# Patient Record
Sex: MALE | Race: WHITE | URBAN - METROPOLITAN AREA
[De-identification: names, ages, dates, MRNs, and addresses within clinical notes are randomized per-mention and may not be internally consistent; named-entity substitution may affect disease eponyms.]

---

## 2017-06-22 ENCOUNTER — HOSPITAL ENCOUNTER (EMERGENCY)
Facility: CLINIC | Age: 32
Discharge: HOME | End: 2017-06-22
Attending: EMERGENCY MEDICINE | Admitting: EMERGENCY MEDICINE
Payer: COMMERCIAL

## 2017-06-22 VITALS
RESPIRATION RATE: 22 BRPM | SYSTOLIC BLOOD PRESSURE: 144 MMHG | DIASTOLIC BLOOD PRESSURE: 93 MMHG | TEMPERATURE: 97.9 F | OXYGEN SATURATION: 95 %

## 2017-06-22 DIAGNOSIS — S05.02XA CORNEAL ABRASION, LEFT, INITIAL ENCOUNTER: ICD-10-CM

## 2017-06-22 DIAGNOSIS — L03.213 PRESEPTAL CELLULITIS OF LEFT EYE: ICD-10-CM

## 2017-06-22 PROCEDURE — 99283 EMERGENCY DEPT VISIT LOW MDM: CPT

## 2017-06-22 PROCEDURE — 25000132 ZZH RX MED GY IP 250 OP 250 PS 637: Performed by: EMERGENCY MEDICINE

## 2017-06-22 PROCEDURE — 25000125 ZZHC RX 250: Performed by: EMERGENCY MEDICINE

## 2017-06-22 RX ORDER — CEPHALEXIN 500 MG/1
500 CAPSULE ORAL ONCE
Status: COMPLETED | OUTPATIENT
Start: 2017-06-22 | End: 2017-06-22

## 2017-06-22 RX ORDER — CEPHALEXIN 500 MG/1
500 CAPSULE ORAL 4 TIMES DAILY
Qty: 28 CAPSULE | Refills: 0 | Status: SHIPPED | OUTPATIENT
Start: 2017-06-22 | End: 2017-06-29

## 2017-06-22 RX ORDER — PROPARACAINE HYDROCHLORIDE 5 MG/ML
2 SOLUTION/ DROPS OPHTHALMIC ONCE
Status: COMPLETED | OUTPATIENT
Start: 2017-06-22 | End: 2017-06-22

## 2017-06-22 RX ORDER — SULFAMETHOXAZOLE/TRIMETHOPRIM 800-160 MG
1 TABLET ORAL ONCE
Status: COMPLETED | OUTPATIENT
Start: 2017-06-22 | End: 2017-06-22

## 2017-06-22 RX ORDER — OFLOXACIN 3 MG/ML
2 SOLUTION/ DROPS OPHTHALMIC ONCE
Status: COMPLETED | OUTPATIENT
Start: 2017-06-22 | End: 2017-06-22

## 2017-06-22 RX ORDER — SULFAMETHOXAZOLE/TRIMETHOPRIM 800-160 MG
1 TABLET ORAL 2 TIMES DAILY
Qty: 14 TABLET | Refills: 0 | Status: SHIPPED | OUTPATIENT
Start: 2017-06-22 | End: 2017-06-29

## 2017-06-22 RX ADMIN — PROPARACAINE HYDROCHLORIDE 2 DROP: 5 SOLUTION/ DROPS OPHTHALMIC at 02:25

## 2017-06-22 RX ADMIN — OFLOXACIN 2 DROP: 3 SOLUTION/ DROPS OPHTHALMIC at 02:36

## 2017-06-22 RX ADMIN — FLUORESCEIN SODIUM 1 STRIP: 1 STRIP OPHTHALMIC at 02:26

## 2017-06-22 RX ADMIN — SULFAMETHOXAZOLE AND TRIMETHOPRIM 1 TABLET: 800; 160 TABLET ORAL at 02:27

## 2017-06-22 RX ADMIN — CEPHALEXIN 500 MG: 500 CAPSULE ORAL at 02:27

## 2017-06-22 ASSESSMENT — VISUAL ACUITY
OD: 20/80
OS: 20/100
OD: 20/80
OS: 20/80

## 2017-06-22 ASSESSMENT — ENCOUNTER SYMPTOMS
EYE PAIN: 1
EYE REDNESS: 1
PHOTOPHOBIA: 1

## 2017-06-22 NOTE — ED AVS SNAPSHOT
Emergency Department    [address] [place] [address]    Phone:  [phone]    Fax:  [phone]                                       Trev Marinelli   MRN: [id number]    Department:   Emergency Department   Date of Visit:  6/22/2017           After Visit Summary Signature Page     I have received my discharge instructions, and my questions have been answered. I have discussed any challenges I see with this plan with the nurse or doctor.    ..........................................................................................................................................  Patient/Patient Representative Signature      ..........................................................................................................................................  Patient Representative Print Name and Relationship to Patient    ..................................................               ................................................  Date                                            Time    ..........................................................................................................................................  Reviewed by Signature/Title    ...................................................              ..............................................  Date                                                            Time

## 2017-06-22 NOTE — ED PROVIDER NOTES
History     Chief Complaint:  Eye problem     HPI   Trev Marinelli is a 31 year old male who presents to the emergency department today for evaluation of a left eye problem. The patient reports experiencing worsening left eye redness, pain, and photophobia over last week. He states trouble keeping his left eye open over the last week 2/2 pain with light. Additionally, he states any changing or brightened light blinds him with pain for a few minutes and then passes. He originally attributed his problem to his contact lenses, however he did not wear them today and his pain continued to worsen. Due to worsening eye pain he presents to the emergency department today for further evaluation. On examination in a dimly lit room he states his eye is doing fine, and has no problem keeping it open. He denies any pain with moving his eye or any recent eye trauma. He does note that his L eyelids seemed slightly swollen earlier today. The patient has no other concerns at this time.     Allergies:  Amoxicillin      Medications:    The patient is currently on no regular medications.    Past Medical History:    Asthma  Substance abuse     Past Surgical History:    ENT surgery     Family History:    No family history on file.    Social History:  The patient was accompanied to the ED by a friend.  Smoking Status: Current every day smoker (1/2 pack)  Smokeless Tobacco: Never used   Alcohol Use: No (quit 11/2016)  Marital Status:        Review of Systems   Eyes: Positive for photophobia, pain, redness and visual disturbance.   All other systems reviewed and are negative.      Physical Exam   First Vitals: BP (!) 176/113  Temp 97.9  F (36.6  C) (Oral)  Resp 22  SpO2 95%       Physical Exam   General/Appearance: appears stated age, well-groomed, appears mildly comfortable, holding eyes closed in light  Eyes: EOMI, no pain with ranging eyes, PERRL, L injected sclera, no conjunctival hemorrhage, L upper and lower lids slightly  edematous, no FB on eyelid eversion, no hyphema, no hypopyon, fluorescein with punctate abnormal uptake approx inferior aspect of L, no Tejas sign  Visual Acuity - R 20/40, L 20/40 (both uncorrected  ENT: MMM  Neck: supple, nl ROM, no stiffness  Cardiovascular: RRR, nl S1S2, no m/r/g, 2+ pulses in all 4 extremities, cap refill <2sec  Respiratory: CTAB, good air movement throughout, no wheezes/rhonchi/rales, no increased WOB, no retractions  Skin: warm and well-perfused, no rash, no edema, no ecchymosis, nl turgor  Neuro: GCS 15, alert and oriented, no gross focal neuro deficits    Emergency Department Course     Interventions:  0225 Alcaine 0.5% opthalmic solution 2 drops to left eye   0226 Fluorescein opthalmic strip given to left eye   0227 Bactrim DS/Septra -160mg tablet PO  0227 Keflex 500mg PO      Emergency Department Course:  Nursing notes and vitals reviewed.  I performed an exam of the patient as documented above.   0202 Patient rechecked and updated.  I discussed the treatment plan with the patient. They expressed understanding of this plan and consented to discharge. They will be discharged home with instructions for care and follow up. In addition, the patient will return to the emergency department if their symptoms persist, worsen, if new symptoms arise or if there is any concern.  All questions were answered.      Impression & Plan      Medical Decision Making:  Trev Marinelli is a 31 year old male who presents with increasing left eye irritation and photophobia over the past week. He does wear contact lenses. There is a small palpate area of increased fluorescein uptake approximately 7 o'clock on his left iris that I suspect is the primary cause. I think this is likely continued to be irritated over the week as he has been wearing his contact lenses. I am not able to appreciate any foreign body nor visible hypopyon or hyphema. His sclera is injected but otherwise there is no keratitis or other  uptake. His left eyelids questionably are slightly edematous. He does state that they were visibly swollen before arrival approximately one to two hours ago. I'd rather be safe than sorry and therefore I am starting Keflex and Bactrim to treat for possible pre septile cellulitis. He has no pain with occular movement, visual changes. His visual acuity here is normal. I doubt orbital cellulitis, acute glaucoma, other intraocular pathologies. I have asked him to follow up with the ophthalmologist tomorrow if symptoms are not improving.     Diagnosis:    ICD-10-CM    1. Corneal abrasion, left, initial encounter S05.02XA    2. Preseptal cellulitis of left eye L03.213      Disposition:   Discharged to home with the below prescription     Discharge Medications:  New Prescriptions    CEPHALEXIN (KEFLEX) 500 MG CAPSULE    Take 1 capsule (500 mg) by mouth 4 times daily for 7 days    SULFAMETHOXAZOLE-TRIMETHOPRIM (BACTRIM DS) 800-160 MG PER TABLET    Take 1 tablet by mouth 2 times daily for 7 days       Scribe Disclosure:  I, Jeevan Holden, am serving as a scribe at 1:49 AM on 6/22/2017 to document services personally performed by Nickie Myers MD, based on my observations and the provider's statements to me.      Jeevan Holden  6/22/2017    EMERGENCY DEPARTMENT       Nickie Myers MD  06/22/17 [id number]

## 2017-06-22 NOTE — ED AVS SNAPSHOT
Emergency Department    [address] [facility] [address]    Phone:  [phone]    Fax:  [phone]                                       Trev Marinelli   MRN: [id number]    Department:   Emergency Department   Date of Visit:  6/22/2017           Patient Information     Date Of Birth          1985        Your diagnoses for this visit were:     Corneal abrasion, left, initial encounter     Preseptal cellulitis of left eye        You were seen by Nickie Myers MD.      Follow-up Information     Follow up with Hunter Arteaga MD. Schedule an appointment as soon as possible for a visit in 1 day.    Specialty:  Ophthalmology    Contact information:    [facility]  [address] [id number] [phone]          Follow up with  Emergency Department.    Specialty:  EMERGENCY MEDICINE    Why:  As needed, If symptoms worsen    Contact information:    [address] [facility] [id number] [phone]        Discharge Instructions         Contact Lens Injury    Place 2 drops in left eye every 2 hours (while awake) for the first 2 days. Then place 2 drops in left eye three times/day for 5 more days.  Your contact lens can cause injury to the cornea (the clear part in the front of the eye). This can occur by sleeping with a hard or soft contact lens in place or wearing a contact lens longer than advised. There is also an increased risk of injury if your eyes dry out too much while wearing a contact lens.  The cornea is very painful when injured, but it usually heals quickly. It usually improves within 24 to 48 hours. If the injury is deep, your healthcare provider may apply an eye patch. This is to reduce pain and speed up the healing process. An antibiotic ointment or eye drops may also be used. Healing is complete when the pain stops and there are no other symptoms, such as eye redness, tearing or discharge, or worsening  vision.  Home care    Do not wear contacts until you are pain free.    A cold pack (ice in a plastic bag, wrapped in a towel) may be applied over the eye for 20 minutes at a time to reduce pain.    Acetaminophen or ibuprofen can be used for pain, unless another medicine was prescribed. (Note: If you have chronic liver or kidney disease, or have ever had a stomach ulcer or gastrointestinal bleeding, talk with your healthcare provider before using these medicines.)    If an eye patch was applied:    Apply the ice pack directly over the eye patch as described above.    If you were given a  follow-up appointment for patch removal and re-exam, do not miss it. An eye patch should not be left in place for more than 48 hours, unless advised to do so by your healthcare provider.    Do not drive a motor vehicle or operate machinery with the patch in place. You will have difficulty in judging distances with only one eye.  Follow-up care  Follow up with your healthcare provider, or as advised.  When to seek medical advice  Call your healthcare provider right away if any of these occur:    Increasing eye pain or pain that does not improve after 24 hours    Discharge from the eye    Increasing redness of the eye or swelling of the eyelids    Worsening vision  Date Last Reviewed: 6/14/2015 2000-2017 [employer]. [address] [place] [address], [place] [address]. All rights reserved. This information is not intended as a substitute for professional medical care. Always follow your healthcare professional's instructions.        Periorbital Cellulitis  Periorbital cellulitis is an infection of the tissues around the eye. It is most often caused by an infected scratch or insect bite. Sometimes a sinus infection can cause this problem.  Home care  The following are general care guidelines:  1. Take your antibiotic medicine exactly as directed, until it is finished.  2. You may use over-the-counter medicine as directed based on  age and weight to help with pain and fever, unless another pain medicine was given. If you have liver disease or ever had a stomach ulcer, talk with your healthcare provider before using these medicines. Do not use ibuprofen in children under 6 months of age. Aspirin should never be used in anyone under 18 years of age who is ill with a fever. It may cause severe illness or death.  Follow-up care  Follow up with your healthcare provider, or as advised.  When to seek medical advice  Call your healthcare provider right away if any of these occur:    Increasing swelling or pain around the eye    Increasing redness    Changes in vision    Fever of 100.4 (38  C) oral or 101.5 (38.6  C) rectal for more than 2 days on antibiotics  Date Last Reviewed: 6/1/2016 2000-2017 [employer]. [address] [place] [address]. All rights reserved. This information is not intended as a substitute for professional medical care. Always follow your healthcare professional's instructions.            24 Hour Appointment Hotline       To make an appointment at any [facility], call [phone] ([phone]). If you don't have a family doctor or clinic, we will help you find one. [facility] clinics are conveniently located to serve the needs of you and your family.             Review of your medicines      START taking        Dose / Directions Last dose taken    cephALEXin 500 MG capsule   Commonly known as:  KEFLEX   Dose:  500 mg   Quantity:  28 capsule        Take 1 capsule (500 mg) by mouth 4 times daily for 7 days   Refills:  0        sulfamethoxazole-trimethoprim 800-160 MG per tablet   Commonly known as:  BACTRIM DS   Dose:  1 tablet   Quantity:  14 tablet        Take 1 tablet by mouth 2 times daily for 7 days   Refills:  0                Prescriptions were sent or printed at these locations (2 Prescriptions)                   Other Prescriptions                Printed at Department/Unit printer (2 of  2)         cephALEXin (KEFLEX) 500 MG capsule               sulfamethoxazole-trimethoprim (BACTRIM DS) 800-160 MG per tablet                Orders Needing Specimen Collection     None      Pending Results     No orders found from 6/20/2017 to 6/23/2017.            Pending Culture Results     No orders found from 6/20/2017 to 6/23/2017.            Pending Results Instructions     If you had any lab results that were not finalized at the time of your Discharge, you can call the ED Lab Result RN at [phone]. You will be contacted by this team for any positive Lab results or changes in treatment. The nurses are available 7 days a week from 10A to 6:30P.  You can leave a message 24 hours per day and they will return your call.        Test Results From Your Hospital Stay               Clinical Quality Measure: Blood Pressure Screening     Your blood pressure was checked while you were in the emergency department today. The last reading we obtained was  BP: (!) 176/113 . Please read the guidelines below about what these numbers mean and what you should do about them.  If your systolic blood pressure (the top number) is less than 120 and your diastolic blood pressure (the bottom number) is less than 80, then your blood pressure is normal. There is nothing more that you need to do about it.  If your systolic blood pressure (the top number) is 120-139 or your diastolic blood pressure (the bottom number) is 80-89, your blood pressure may be higher than it should be. You should have your blood pressure rechecked within a year by a primary care provider.  If your systolic blood pressure (the top number) is 140 or greater or your diastolic blood pressure (the bottom number) is 90 or greater, you may have high blood pressure. High blood pressure is treatable, but if left untreated over time it can put you at risk for heart attack, stroke, or kidney failure. You should have your blood pressure rechecked by a primary care  "provider within the next 4 weeks.  If your provider in the emergency department today gave you specific instructions to follow-up with your doctor or provider even sooner than that, you should follow that instruction and not wait for up to 4 weeks for your follow-up visit.        Thank you for choosing [facility]       Thank you for choosing [facility] for your care. Our goal is always to provide you with excellent care. Hearing back from our patients is one way we can continue to improve our services. Please take a few minutes to complete the written survey that you may receive in the mail after you visit with us. Thank you!        WebEvents Information     WebEvents lets you send messages to your doctor, view your test results, renew your prescriptions, schedule appointments and more. To sign up, go to [email or web address] . Click on \"Log in\" on the left side of the screen, which will take you to the Welcome page. Then click on \"Sign up Now\" on the right side of the page.     You will be asked to enter the access code listed below, as well as some personal information. Please follow the directions to create your username and password.     Your access code is: [email or web address]  Expires: 2017  2:32 AM     Your access code will  in 90 days. If you need help or a new code, please call your [facility] clinic or [phone].        Care EveryWhere ID     This is your Care EveryWhere ID. This could be used by other organizations to access your [facility] medical records  [id number]        Equal Access to Services     LUCRECIA MENDOZA : Hadii alejandro godinezo Sopablo, waaxda luqadaha, qaybta kaalmada haresh bautista . So [facility] [phone].    ATENCIÓN: Si habla español, tiene a leon disposición servicios gratuitos de asistencia lingüística. Llame al [phone].    We comply with applicable federal civil rights laws and [place] laws. We do not discriminate on the basis of race, color, " national origin, age, disability sex, sexual orientation or gender identity.            After Visit Summary       This is your record. Keep this with you and show to your community pharmacist(s) and doctor(s) at your next visit.

## 2017-06-22 NOTE — DISCHARGE INSTRUCTIONS
Contact Lens Injury    Place 2 drops in left eye every 2 hours (while awake) for the first 2 days. Then place 2 drops in left eye three times/day for 5 more days.  Your contact lens can cause injury to the cornea (the clear part in the front of the eye). This can occur by sleeping with a hard or soft contact lens in place or wearing a contact lens longer than advised. There is also an increased risk of injury if your eyes dry out too much while wearing a contact lens.  The cornea is very painful when injured, but it usually heals quickly. It usually improves within 24 to 48 hours. If the injury is deep, your healthcare provider may apply an eye patch. This is to reduce pain and speed up the healing process. An antibiotic ointment or eye drops may also be used. Healing is complete when the pain stops and there are no other symptoms, such as eye redness, tearing or discharge, or worsening vision.  Home care    Do not wear contacts until you are pain free.    A cold pack (ice in a plastic bag, wrapped in a towel) may be applied over the eye for 20 minutes at a time to reduce pain.    Acetaminophen or ibuprofen can be used for pain, unless another medicine was prescribed. (Note: If you have chronic liver or kidney disease, or have ever had a stomach ulcer or gastrointestinal bleeding, talk with your healthcare provider before using these medicines.)    If an eye patch was applied:    Apply the ice pack directly over the eye patch as described above.    If you were given a  follow-up appointment for patch removal and re-exam, do not miss it. An eye patch should not be left in place for more than 48 hours, unless advised to do so by your healthcare provider.    Do not drive a motor vehicle or operate machinery with the patch in place. You will have difficulty in judging distances with only one eye.  Follow-up care  Follow up with your healthcare provider, or as advised.  When to seek medical advice  Call your healthcare  provider right away if any of these occur:    Increasing eye pain or pain that does not improve after 24 hours    Discharge from the eye    Increasing redness of the eye or swelling of the eyelids    Worsening vision  Date Last Reviewed: 6/14/2015 2000-2017 [employer]. [address] [place] [address]. All rights reserved. This information is not intended as a substitute for professional medical care. Always follow your healthcare professional's instructions.        Periorbital Cellulitis  Periorbital cellulitis is an infection of the tissues around the eye. It is most often caused by an infected scratch or insect bite. Sometimes a sinus infection can cause this problem.  Home care  The following are general care guidelines:  1. Take your antibiotic medicine exactly as directed, until it is finished.  2. You may use over-the-counter medicine as directed based on age and weight to help with pain and fever, unless another pain medicine was given. If you have liver disease or ever had a stomach ulcer, talk with your healthcare provider before using these medicines. Do not use ibuprofen in children under 6 months of age. Aspirin should never be used in anyone under 18 years of age who is ill with a fever. It may cause severe illness or death.  Follow-up care  Follow up with your healthcare provider, or as advised.  When to seek medical advice  Call your healthcare provider right away if any of these occur:    Increasing swelling or pain around the eye    Increasing redness    Changes in vision    Fever of 100.4 (38  C) oral or 101.5 (38.6  C) rectal for more than 2 days on antibiotics  Date Last Reviewed: 6/1/2016 2000-2017 [employer]. [address] [place] [address]. All rights reserved. This information is not intended as a substitute for professional medical care. Always follow your healthcare professional's instructions.

## 2022-06-05 ENCOUNTER — EMERGENCY ROOM – HOSPITAL (OUTPATIENT)
Dept: URBAN - METROPOLITAN AREA HOSPITAL 92 | Facility: HOSPITAL | Age: 37
End: 2022-06-05

## 2022-06-05 DIAGNOSIS — T18.128A FOOD IN ESOPHAGUS CAUSING OTHER INJURY, INITIAL ENCOUNTER: ICD-10-CM

## 2022-06-05 PROCEDURE — 99283 EMERGENCY DEPT VISIT LOW MDM: CPT | Mod: 25 | Performed by: INTERNAL MEDICINE

## 2022-06-05 PROCEDURE — 43247 EGD REMOVE FOREIGN BODY: CPT | Performed by: INTERNAL MEDICINE
